# Patient Record
(demographics unavailable — no encounter records)

---

## 2025-02-19 NOTE — HISTORY OF PRESENT ILLNESS
[de-identified] : Pneumonia [FreeTextEntry6] : Recheck pneumonia/cough Dad states that she is feeling better - still with mild cough but much improved.  Completed Azithromycin without issue.  d/c nebs once wheezing resolved.  NO fever.  Appetite/activity at baseline, drinking well, good UO.  No vomiting/No diarrhea.   +.

## 2025-02-19 NOTE — PHYSICAL EXAM
[NL] : warm, clear [Consolable] : consolable [Playful] : playful [Clear Rhinorrhea] : clear rhinorrhea [Wheezing] : no wheezing [Crackles] : no crackles [Belly Breathing] : no belly breathing [Subcostal Retractions] : no subcostal retractions [Suprasternal Retractions] : no suprasternal retractions

## 2025-02-19 NOTE — DISCUSSION/SUMMARY
[FreeTextEntry1] :  2 year old female for recheck pneumonia - well hydrated, no acute distress.  Completed antibx without issues.  d/w dad to start Zyrtec qHS as directed.  Continue cool mist humidifier. Steam up bathroom.  Good hydration/good hand hygiene reviewed.  Reviewed signs of stridor/respiratory distress.  Return sooner if condition worsens.  Regular diet as tolerated.  Indications, intervals of Motrin/Tylenol reviewed.  RED FLAGS REVIEWED - indications for ED eval discussed, signs of distress/dehydration reviewed, respiratory red flags reviewed- Dad agrees with plan, demonstrates an understanding, is able to repeat back instructions and has no questions at this time.  Return sooner if needed.  Well care as scheduled.

## 2025-02-19 NOTE — HISTORY OF PRESENT ILLNESS
[de-identified] : Pneumonia [FreeTextEntry6] : Recheck pneumonia/cough Dad states that she is feeling better - still with mild cough but much improved.  Completed Azithromycin without issue.  d/c nebs once wheezing resolved.  NO fever.  Appetite/activity at baseline, drinking well, good UO.  No vomiting/No diarrhea.   +.

## 2025-02-19 NOTE — PHYSICAL EXAM
Controlled with current regime [NL] : warm, clear [Consolable] : consolable [Playful] : playful [Clear Rhinorrhea] : clear rhinorrhea [Wheezing] : no wheezing [Crackles] : no crackles [Belly Breathing] : no belly breathing [Subcostal Retractions] : no subcostal retractions [Suprasternal Retractions] : no suprasternal retractions

## 2025-06-06 NOTE — DISCUSSION/SUMMARY
September 4, 2020      Emanuel Lopez MD  [address]  [address]  [place] [address]           [place] [facility]  [address]  [facility] [address]  Phone: [phone]          Patient: Vick Gonzalez   MR Number: [id number]   YOB: 1957   Date of Visit: 9/4/2020       Dear Dr. Emanuel Lopez:    Thank you for referring Vick Gonzalez to me for evaluation. Attached you will find relevant portions of my assessment and plan of care.    If you have questions, please do not hesitate to call me. I look forward to following Vick Gonzalez along with you.    Sincerely,    Therese Lisa MD    Enclosure  CC:  No Recipients    If you would like to receive this communication electronically, please contact [email or web address] or [phone] to request more information on [employer] access.    For providers and/or their staff who would like to refer a patient to [facility], please contact us through our one-stop-shop provider referral line, [facility] , at [phone].    If you feel you have received this communication in error or would no longer like to receive these types of communications, please e-mail [email or web address]         
[FreeTextEntry1] :  well hydrated, in no distress Antibiotic as ordered Instructed the parents to encourage fluids, treat a quantified temp of 100.4 or greater with acetaminophen or ibuprofen continue albuterol twice daily budesonide daily for about 2 weeks If condition worsens return for re-eval Red Flags reviewed indications for ED eval discussed, signs of distress/ dehydration reviewed parent understands plan and has no questions at this time
[FreeTextEntry1] :  well hydrated, in no distress Antibiotic as ordered Instructed the parents to encourage fluids, treat a quantified temp of 100.4 or greater with acetaminophen or ibuprofen continue albuterol twice daily budesonide daily for about 2 weeks If condition worsens return for re-eval Red Flags reviewed indications for ED eval discussed, signs of distress/ dehydration reviewed parent understands plan and has no questions at this time

## 2025-06-06 NOTE — REVIEW OF SYSTEMS
[Fever] : fever [Fussy] : fussy [Nasal Congestion] : nasal congestion [Cough] : cough [Appetite Changes] : appetite changes [Negative] : Genitourinary

## 2025-06-06 NOTE — HISTORY OF PRESENT ILLNESS
[EENT/Resp Symptoms] : EENT/RESPIRATORY SYMPTOMS [Nasal congestion] : nasal congestion [___ Day(s)] : [unfilled] day(s) [Irritable] : irritable [Consolable] : consolable [At Night] : at night [Acetaminophen] : acetaminophen [Ibuprofen] : ibuprofen [Fever] : fever [Nasal Congestion] : nasal congestion [Cough] : cough [Posttussive emesis] : posttussive emesis [Max Temp: ____] : Max temperature: [unfilled] [Vomiting] : no vomiting [Diarrhea] : no diarrhea [Decreased Urine Output] : no decreased urine output [Rash] : no rash [FreeTextEntry1] : asthma 1 week ago rx orapred and was doing much better

## 2025-06-06 NOTE — PHYSICAL EXAM
[Mucoid Discharge] : mucoid discharge [Crackles] : crackles [NL] : warm, clear [Wheezing] : no wheezing [Tachypnea] : no tachypnea